# Patient Record
Sex: MALE | Race: WHITE | URBAN - METROPOLITAN AREA
[De-identification: names, ages, dates, MRNs, and addresses within clinical notes are randomized per-mention and may not be internally consistent; named-entity substitution may affect disease eponyms.]

---

## 2022-04-14 ENCOUNTER — OFFICE VISIT (OUTPATIENT)
Dept: URBAN - METROPOLITAN AREA CLINIC 1 | Facility: CLINIC | Age: 57
End: 2022-04-14
Payer: COMMERCIAL

## 2022-04-14 DIAGNOSIS — E11.9 TYPE 2 DIABETES MELLITUS W/O COMPLICATION: Primary | ICD-10-CM

## 2022-04-14 DIAGNOSIS — H04.123 DRY EYE SYNDROME OF BILATERAL LACRIMAL GLANDS: ICD-10-CM

## 2022-04-14 DIAGNOSIS — H52.223 REGULAR ASTIGMATISM, BILATERAL: ICD-10-CM

## 2022-04-14 DIAGNOSIS — H25.13 AGE-RELATED NUCLEAR CATARACT, BILATERAL: ICD-10-CM

## 2022-04-14 PROCEDURE — 99203 OFFICE O/P NEW LOW 30 MIN: CPT | Performed by: OPHTHALMOLOGY

## 2022-04-14 NOTE — IMPRESSION/PLAN
Impression: Type 2 diabetes mellitus w/o complication: G99.7. Plan: Discussed the pathophysiology of diabetes and its effect on the eye. Stressed the importance of strong glucose control. Advised of importance of scheduled dilated examinations, and to contact us immediately for any problems or concerns. 

RV 1 YR